# Patient Record
Sex: FEMALE | Race: WHITE | NOT HISPANIC OR LATINO | Employment: UNEMPLOYED | URBAN - METROPOLITAN AREA
[De-identification: names, ages, dates, MRNs, and addresses within clinical notes are randomized per-mention and may not be internally consistent; named-entity substitution may affect disease eponyms.]

---

## 2023-05-08 ENCOUNTER — TELEPHONE (OUTPATIENT)
Dept: PRIMARY CARE CLINIC | Facility: CLINIC | Age: 33
End: 2023-05-08
Payer: COMMERCIAL

## 2023-05-08 NOTE — TELEPHONE ENCOUNTER
Spoke with pt and let her know that she needs to call the  and have them change her primary insurance from medicaid

## 2023-05-08 NOTE — TELEPHONE ENCOUNTER
----- Message from Cesia Gooden sent at 5/8/2023 12:11 PM CDT -----  Regarding: appt req  Contact: pt  Type: Appointment Request    Caller is requesting an appointment     Name of Caller: pt  Reason for appointment: wellness  Would the patient rather a call back or a response via [facility]? Call back  Best Call Back Number: [phone]  Additional Information: pt would like a  appt  Please call to advise, Thank You

## 2023-05-10 ENCOUNTER — TELEPHONE (OUTPATIENT)
Dept: PRIMARY CARE CLINIC | Facility: CLINIC | Age: 33
End: 2023-05-10
Payer: COMMERCIAL

## 2023-05-10 NOTE — TELEPHONE ENCOUNTER
Spoke with pt and advised her that Dr. Zhang don't have anything on 06/09/2023. Pt advised me that she will find someone that can see her. I offered pt another date pt declined.

## 2023-05-10 NOTE — TELEPHONE ENCOUNTER
----- Message from Cristel Garcia sent at 5/10/2023 10:46 AM CDT -----  Contact: Pt  Type:  Sooner Appointment Request    Caller is requesting a sooner appointment.  Caller declined first available appointment listed below.  Caller will not accept being placed on the waitlist and is requesting a message be sent to doctor.    Name of Caller:  Pt  When is the first available appointment?  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

## 2023-06-13 ENCOUNTER — PATIENT MESSAGE (OUTPATIENT)
Dept: PRIMARY CARE CLINIC | Facility: CLINIC | Age: 33
End: 2023-06-13
Payer: COMMERCIAL

## 2023-06-13 DIAGNOSIS — E88.819 INSULIN RESISTANCE: Primary | ICD-10-CM

## 2023-06-13 RX ORDER — METFORMIN HYDROCHLORIDE 500 MG/1
1000 TABLET, EXTENDED RELEASE ORAL 2 TIMES DAILY
Qty: 360 TABLET | Refills: 0 | Status: SHIPPED | OUTPATIENT
Start: 2023-06-13 | End: 2023-09-21

## 2023-06-14 ENCOUNTER — PATIENT MESSAGE (OUTPATIENT)
Dept: PRIMARY CARE CLINIC | Facility: CLINIC | Age: 33
End: 2023-06-14
Payer: COMMERCIAL

## 2023-06-29 ENCOUNTER — PATIENT MESSAGE (OUTPATIENT)
Dept: PRIMARY CARE CLINIC | Facility: CLINIC | Age: 33
End: 2023-06-29

## 2023-06-29 ENCOUNTER — OFFICE VISIT (OUTPATIENT)
Dept: PRIMARY CARE CLINIC | Facility: CLINIC | Age: 33
End: 2023-06-29
Payer: COMMERCIAL

## 2023-06-29 DIAGNOSIS — E03.9 ACQUIRED HYPOTHYROIDISM: ICD-10-CM

## 2023-06-29 DIAGNOSIS — E88.819 INSULIN RESISTANCE: Primary | ICD-10-CM

## 2023-06-29 DIAGNOSIS — E55.9 VITAMIN D DEFICIENCY: ICD-10-CM

## 2023-06-29 PROCEDURE — 99214 OFFICE O/P EST MOD 30 MIN: CPT | Mod: 95,,, | Performed by: FAMILY MEDICINE

## 2023-06-29 PROCEDURE — 99214 PR OFFICE/OUTPT VISIT, EST, LEVL IV, 30-39 MIN: ICD-10-PCS | Mod: 95,,, | Performed by: FAMILY MEDICINE

## 2023-06-29 RX ORDER — LEVOTHYROXINE SODIUM 50 UG/ML
1 SOLUTION ORAL DAILY
Qty: 90 ML | Refills: 1 | Status: SHIPPED | OUTPATIENT
Start: 2023-06-29 | End: 2023-10-10 | Stop reason: SDUPTHER

## 2023-06-29 RX ORDER — ERGOCALCIFEROL 1.25 MG/1
50000 CAPSULE ORAL WEEKLY
Qty: 12 CAPSULE | Refills: 0 | Status: SHIPPED | OUTPATIENT
Start: 2023-06-29 | End: 2023-08-22

## 2023-06-29 NOTE — PROGRESS NOTES
[facility]       [address] [place] [address]      [phone]     Marivel Zhang MD         .      TELEMEDICINE VISIT  06/29/2023    Elly Francis is a 33 y.o. female who presents for Telemed visit.   The patient location is: [place]    The chief complaint leading to consultation is: discuss recent stressors.  Patient has hx of IRS and hypothyroidism.  Felt great when she was taking metformin and tirosint but after losing weight and eating well, she was able to get off but with recent stress, she has gained nearly 30#. Patient is interested in      REVIEW OF SYMPTOMS  All negative except as stated above.      PHYSICAL EXAM:  Alert and awake, Normal appearance, Fatigued appearing, and overweight/obese  Normocephalic, Atraumatic , and Normal facies  EOMI intact and Clear conjunctivae    No cyanosis and No labored breathing  Normal Abdomen, No tenderness/guarding, and Increased Abdominal Girth  Normal Musculoskeletal Exam and Normal Range of Motion  Grossly intact, No tremors observed, and No tics observed  Normal mood, Normal affect, Normal behavior, Congruent sppech, Normal memory, Normal speech, and concerned      ASSESSMENT AND PLAN      1. Insulin resistance  Overview:  Pathophysiology of insulin resistance discussed with patient and therapeutic options were explained.  Dietary and lifestyle changes are recommended for the treatment of insulin resistance.   Low carbohydrate diet and/or possible intermittent fasting is recommended for insulin resistance and/or prediabetes.  Non FDA approved treatment options include but is not limited to GLP's, SGLT-2's, biguanides, and other glucose support supplements. Patient will notify us with any concerns or complaints regarding treatment plan.Weight loss is strongly encouraged and is emphasized to help reduce risk of diabetes.     Assessment & Plan:  Just started back on metformin- will  monitor labs in 3mos    Orders:  -     CBC Auto Differential; Future; Expected date: 09/29/2023  -     Comprehensive Metabolic Panel; Future; Expected date: 09/29/2023  -     Insulin, Random; Future; Expected date: 09/29/2023    2. Acquired hypothyroidism  Overview:  Patient advised to comply with thyroid medications as directed. Patient should take thyroid meds on empty stomach and avoid taking with iron, calcium, zinc and fiber.  Potential risks associated with suppressing TSH (increased arrhythmia and bone loss) can occur as a result of thyroid replacement therapy. Emphasis is  on improving patient symptoms. Patient should notify us if any symptoms occur suggestive of overactive thyroid (fast heart rate, anxiety, sleeplessness, and tremors). Routine follow up recommended      Assessment & Plan:  Will restart thtyroid meds.    Orders:  -     TIROSINT-SOL 50 mcg/mL Soln; Take 1 mL by mouth once daily.  Dispense: 90 mL; Refill: 1  -     TSH; Future; Expected date: 09/29/2023  -     T4, Free; Future; Expected date: 09/29/2023  -     T3, Free; Future; Expected date: 09/29/2023    3. Vitamin D deficiency  Assessment & Plan:  We will replace as this could help with overall mood well-being    Orders:  -     ergocalciferol (ERGOCALCIFEROL) 50,000 unit Cap; Take 1 capsule (50,000 Units total) by mouth once a week.  Dispense: 12 capsule; Refill: 0          I spent a total of 35 minutes face to face and non-face to face on the date of this visit.This includes time preparing to see the patient (eg, review of tests, notes), obtaining and/or reviewing additional history from an independent historian and/or outside medical records, documenting clinical information in the electronic health record, independently interpreting results and/or communicating results to the patient/family/caregiver, or care coordinator.    Disclaimer: Portions of this record may have been created with voice recognition software. Occasional wrong-word or  ""sound-a-like" substitutions may have occurred due to inherent limitations of voice recognition software. Read the chart carefully and recognize, using context, where substitutions have occurred."    Visit type: Telemedicine:audio and visual  Each patient to whom he or she provides medical services by telemedicine is:  (1) informed of the relationship between the physician and patient and the respective role of any other health care provider with respect to management of the patient; and (2) notified that he or she may decline to receive medical services by telemedicine and may withdraw from such care at any time.    Signed by:  MD Dr. Marivel Turner MD      "

## 2023-09-20 DIAGNOSIS — E88.819 INSULIN RESISTANCE: ICD-10-CM

## 2023-09-21 RX ORDER — METFORMIN HYDROCHLORIDE 500 MG/1
TABLET, EXTENDED RELEASE ORAL
Qty: 360 TABLET | Refills: 0 | Status: SHIPPED | OUTPATIENT
Start: 2023-09-21 | End: 2023-12-26

## 2023-10-01 ENCOUNTER — PATIENT MESSAGE (OUTPATIENT)
Dept: PRIMARY CARE CLINIC | Facility: CLINIC | Age: 33
End: 2023-10-01
Payer: COMMERCIAL

## 2023-10-10 DIAGNOSIS — E03.9 ACQUIRED HYPOTHYROIDISM: ICD-10-CM

## 2023-10-10 RX ORDER — LEVOTHYROXINE SODIUM 50 UG/ML
SOLUTION ORAL
Qty: 90 ML | Refills: 1 | Status: SHIPPED | OUTPATIENT
Start: 2023-10-10

## 2023-12-26 DIAGNOSIS — E88.819 INSULIN RESISTANCE: ICD-10-CM

## 2023-12-26 RX ORDER — METFORMIN HYDROCHLORIDE 500 MG/1
TABLET, EXTENDED RELEASE ORAL
Qty: 360 TABLET | Refills: 0 | Status: SHIPPED | OUTPATIENT
Start: 2023-12-26 | End: 2024-04-02

## 2024-02-01 ENCOUNTER — PATIENT MESSAGE (OUTPATIENT)
Dept: PRIMARY CARE CLINIC | Facility: CLINIC | Age: 34
End: 2024-02-01
Payer: COMMERCIAL

## 2024-02-02 RX ORDER — VALACYCLOVIR HYDROCHLORIDE 1 G/1
1000 TABLET, FILM COATED ORAL 2 TIMES DAILY
Qty: 10 TABLET | Refills: 0 | Status: SHIPPED | OUTPATIENT
Start: 2024-02-02 | End: 2024-04-02 | Stop reason: SDUPTHER

## 2024-03-19 ENCOUNTER — OFFICE VISIT (OUTPATIENT)
Dept: INTERNAL MEDICINE | Facility: CLINIC | Age: 34
End: 2024-03-19
Payer: COMMERCIAL

## 2024-03-19 VITALS
DIASTOLIC BLOOD PRESSURE: 70 MMHG | HEIGHT: 67 IN | TEMPERATURE: 99 F | HEART RATE: 90 BPM | OXYGEN SATURATION: 100 % | SYSTOLIC BLOOD PRESSURE: 118 MMHG | WEIGHT: 174.38 LBS | BODY MASS INDEX: 27.37 KG/M2

## 2024-03-19 DIAGNOSIS — Z00.00 ROUTINE HEALTH MAINTENANCE: ICD-10-CM

## 2024-03-19 DIAGNOSIS — E03.9 ACQUIRED HYPOTHYROIDISM: ICD-10-CM

## 2024-03-19 DIAGNOSIS — E88.819 INSULIN RESISTANCE: Primary | ICD-10-CM

## 2024-03-19 PROBLEM — Z90.79 HISTORY OF BILATERAL SALPINGECTOMY: Status: ACTIVE | Noted: 2024-03-19

## 2024-03-19 PROBLEM — Z98.890 HISTORY OF ENDOMETRIAL ABLATION: Status: ACTIVE | Noted: 2024-03-19

## 2024-03-19 PROBLEM — Z91.89 INCREASED RISK OF BREAST CANCER: Status: ACTIVE | Noted: 2024-03-07

## 2024-03-19 PROCEDURE — 3008F BODY MASS INDEX DOCD: CPT | Mod: CPTII,S$GLB,, | Performed by: PHYSICIAN ASSISTANT

## 2024-03-19 PROCEDURE — 3078F DIAST BP <80 MM HG: CPT | Mod: CPTII,S$GLB,, | Performed by: PHYSICIAN ASSISTANT

## 2024-03-19 PROCEDURE — 3074F SYST BP LT 130 MM HG: CPT | Mod: CPTII,S$GLB,, | Performed by: PHYSICIAN ASSISTANT

## 2024-03-19 PROCEDURE — 1160F RVW MEDS BY RX/DR IN RCRD: CPT | Mod: CPTII,S$GLB,, | Performed by: PHYSICIAN ASSISTANT

## 2024-03-19 PROCEDURE — 1159F MED LIST DOCD IN RCRD: CPT | Mod: CPTII,S$GLB,, | Performed by: PHYSICIAN ASSISTANT

## 2024-03-19 PROCEDURE — 99204 OFFICE O/P NEW MOD 45 MIN: CPT | Mod: S$GLB,,, | Performed by: PHYSICIAN ASSISTANT

## 2024-03-19 PROCEDURE — 99999 PR PBB SHADOW E&M-EST. PATIENT-LVL IV: CPT | Mod: PBBFAC,,, | Performed by: PHYSICIAN ASSISTANT

## 2024-03-19 RX ORDER — TRAZODONE HYDROCHLORIDE 50 MG/1
50 TABLET ORAL NIGHTLY PRN
Qty: 30 TABLET | Refills: 11 | Status: SHIPPED | OUTPATIENT
Start: 2024-03-19 | End: 2024-03-21

## 2024-03-19 RX ORDER — FLUOXETINE HYDROCHLORIDE 20 MG/1
20 CAPSULE ORAL DAILY
Qty: 30 CAPSULE | Refills: 11 | Status: SHIPPED | OUTPATIENT
Start: 2024-03-19 | End: 2024-03-21

## 2024-03-19 NOTE — PROGRESS NOTES
Subjective:      Patient ID: Elly Francis is a 34 y.o. female.    Chief Complaint: Establish Care      Here today to establish care with new provider. She was seeing Dr. Zhang in the past.   Anxiety  Presents for initial visit. Episode onset: 8 months. The problem has been rapidly worsening. Symptoms include decreased concentration, depressed mood, excessive worry, insomnia, malaise, muscle tension, nervous/anxious behavior and restlessness. Patient reports no chest pain, confusion, dizziness, nausea, palpitations, shortness of breath or suicidal ideas. Symptoms occur constantly. The severity of symptoms is causing significant distress. The symptoms are aggravated by family issues (recently adopted special needs child, sudden death of grandfather, dad had stroke). The quality of sleep is non-restorative.     There is no history of anemia, anxiety/panic attacks, arrhythmia, asthma, bipolar disorder, CAD, CHF, chronic lung disease, depression, fibromyalgia, hyperthyroidism or suicide attempts. Past treatments include nothing.     In 2022 pt was able to manage her weight and thyroid problems with lifestyle changes, exercise, weight loss. She was off her tirosint and metformin. IN 2023 she was unexpectedly acquired a special needs new born. Sister was addicted to drugs and baby borne addicted with pulm htn among other related medical problems. Pts life was turned upside down. She has frequent doctors appointment to take the new baby to, at least 4 a week. She has put on a lot of weight since taking on this new baby. She plans on eventually adopting the special needs child. She restarted the metformin and tirosint about 6 months ago but still unable to get the weight off. Not sleeping well at night. Frequently waking up and trouble falling asleep.     Failed tx synthroid due to SE (anxiety).     Patient Active Problem List   Diagnosis    Vitamin D deficiency    Acquired hypothyroidism    Insulin resistance     History of bilateral salpingectomy    History of endometrial ablation    Increased risk of breast cancer         Current Outpatient Medications:     metFORMIN (GLUCOPHAGE-XR) 500 MG ER 24hr tablet, TAKE 2 TABLETS(1000 MG) BY MOUTH TWICE DAILY, Disp: 360 tablet, Rfl: 0    TIROSINT-SOL 50 mcg/mL Soln, TAKE CONtENTS OF ONE AMPULE BY MOUTH DAILY, Disp: 90 mL, Rfl: 1    valACYclovir (VALTREX) 1000 MG tablet, Take 1 tablet (1,000 mg total) by mouth 2 (two) times daily., Disp: 10 tablet, Rfl: 0    Review of Systems   Constitutional:  Positive for unexpected weight change. Negative for activity change, appetite change, chills, diaphoresis, fatigue and fever.   HENT: Negative.  Negative for congestion, hearing loss, postnasal drip, rhinorrhea, sore throat, trouble swallowing and voice change.    Eyes: Negative.  Negative for visual disturbance.   Respiratory: Negative.  Negative for cough, choking, chest tightness and shortness of breath.    Cardiovascular:  Negative for chest pain, palpitations and leg swelling.   Gastrointestinal:  Negative for abdominal distention, abdominal pain, blood in stool, constipation, diarrhea, nausea and vomiting.   Endocrine: Negative for cold intolerance, heat intolerance, polydipsia and polyuria.   Genitourinary: Negative.  Negative for difficulty urinating and frequency.   Musculoskeletal:  Negative for arthralgias, back pain, gait problem, joint swelling and myalgias.   Skin:  Negative for color change, pallor, rash and wound.   Neurological:  Negative for dizziness, tremors, weakness, light-headedness, numbness and headaches.   Hematological:  Negative for adenopathy.   Psychiatric/Behavioral:  Positive for decreased concentration, dysphoric mood and sleep disturbance. Negative for agitation, behavioral problems, confusion, hallucinations, self-injury and suicidal ideas. The patient is nervous/anxious and has insomnia. The patient is not hyperactive.      Objective:   /70 (BP  "Location: Right arm, Patient Position: Sitting, BP Method: Large (Manual))   Pulse 90   Temp 98.9 °F (37.2 °C) (Tympanic)   Ht 5' 7" (1.702 m)   Wt 79.1 kg (174 lb 6.1 oz)   SpO2 100%   BMI 27.31 kg/m²     Physical Exam  Vitals and nursing note reviewed.   Constitutional:       General: She is not in acute distress.     Appearance: Normal appearance. She is well-developed. She is not ill-appearing, toxic-appearing or diaphoretic.   HENT:      Head: Normocephalic and atraumatic.   Cardiovascular:      Rate and Rhythm: Normal rate and regular rhythm.      Heart sounds: Normal heart sounds. No murmur heard.     No friction rub. No gallop.   Pulmonary:      Effort: Pulmonary effort is normal. No respiratory distress.      Breath sounds: Normal breath sounds. No wheezing or rales.   Musculoskeletal:         General: Normal range of motion.   Skin:     General: Skin is warm.      Capillary Refill: Capillary refill takes less than 2 seconds.      Findings: No rash.   Neurological:      Mental Status: She is alert and oriented to person, place, and time.      Motor: No weakness.      Gait: Gait normal.   Psychiatric:         Attention and Perception: Attention and perception normal.         Mood and Affect: Mood is depressed. Affect is tearful.         Speech: Speech normal.         Behavior: Behavior normal.         Thought Content: Thought content normal. Thought content is not paranoid or delusional. Thought content does not include homicidal or suicidal ideation. Thought content does not include homicidal or suicidal plan.         Cognition and Memory: Cognition and memory normal.         Judgment: Judgment normal.         Assessment:     1. Insulin resistance    2. Acquired hypothyroidism    3. Routine health maintenance      Plan:   Insulin resistance  -     Comprehensive Metabolic Panel; Future  -     Hemoglobin A1C; Future; Expected date: 03/19/2024  -     Insulin, random; Future; Expected date: " 03/19/2024    Acquired hypothyroidism  -     TSH; Future; Expected date: 03/19/2024  -     T4, Free; Future; Expected date: 03/19/2024  -     T3; Future; Expected date: 03/19/2024    Routine health maintenance  -     CBC Auto Differential; Future; Expected date: 03/19/2024    Other orders  -     Discontinue: FLUoxetine 20 MG capsule; Take 1 capsule (20 mg total) by mouth once daily.  Dispense: 30 capsule; Refill: 11  -     Discontinue: traZODone (DESYREL) 50 MG tablet; Take 1 tablet (50 mg total) by mouth nightly as needed for Insomnia.  Dispense: 30 tablet; Refill: 11    -diagnosis and treatment options discussed in detail. Agree to start on fluoxetine daily and trazodone as needed at night to help her sleep. SE discussed. Recheck in 1 month.   -recheck labs fasting as well to see if any adjustments need to be made      ADDENDUM: PT DECLINES TO START ANYTHING FOR MOOD. WILL DISCONTINUE THE FLUOXETINE.     Follow up in about 4 weeks (around 4/16/2024), or if symptoms worsen or fail to improve.

## 2024-03-20 ENCOUNTER — PATIENT MESSAGE (OUTPATIENT)
Dept: INTERNAL MEDICINE | Facility: CLINIC | Age: 34
End: 2024-03-20
Payer: COMMERCIAL

## 2024-03-20 ENCOUNTER — LAB VISIT (OUTPATIENT)
Dept: LAB | Facility: HOSPITAL | Age: 34
End: 2024-03-20
Attending: PHYSICIAN ASSISTANT
Payer: COMMERCIAL

## 2024-03-20 DIAGNOSIS — Z00.00 ROUTINE HEALTH MAINTENANCE: ICD-10-CM

## 2024-03-20 DIAGNOSIS — E03.9 ACQUIRED HYPOTHYROIDISM: ICD-10-CM

## 2024-03-20 DIAGNOSIS — E88.819 INSULIN RESISTANCE: ICD-10-CM

## 2024-03-20 LAB
ALBUMIN SERPL BCP-MCNC: 4 G/DL (ref 3.5–5.2)
ALP SERPL-CCNC: 63 U/L (ref 55–135)
ALT SERPL W/O P-5'-P-CCNC: 22 U/L (ref 10–44)
ANION GAP SERPL CALC-SCNC: 8 MMOL/L (ref 8–16)
AST SERPL-CCNC: 23 U/L (ref 10–40)
BASOPHILS # BLD AUTO: 0.06 K/UL (ref 0–0.2)
BASOPHILS NFR BLD: 1.3 % (ref 0–1.9)
BILIRUB SERPL-MCNC: 1.2 MG/DL (ref 0.1–1)
BUN SERPL-MCNC: 10 MG/DL (ref 6–20)
CALCIUM SERPL-MCNC: 9.2 MG/DL (ref 8.7–10.5)
CHLORIDE SERPL-SCNC: 107 MMOL/L (ref 95–110)
CO2 SERPL-SCNC: 24 MMOL/L (ref 23–29)
CREAT SERPL-MCNC: 0.8 MG/DL (ref 0.5–1.4)
DIFFERENTIAL METHOD BLD: ABNORMAL
EOSINOPHIL # BLD AUTO: 0.3 K/UL (ref 0–0.5)
EOSINOPHIL NFR BLD: 6.2 % (ref 0–8)
ERYTHROCYTE [DISTWIDTH] IN BLOOD BY AUTOMATED COUNT: 13.2 % (ref 11.5–14.5)
EST. GFR  (NO RACE VARIABLE): >60 ML/MIN/1.73 M^2
ESTIMATED AVG GLUCOSE: 103 MG/DL (ref 68–131)
GLUCOSE SERPL-MCNC: 91 MG/DL (ref 70–110)
HBA1C MFR BLD: 5.2 % (ref 4–5.6)
HCT VFR BLD AUTO: 42.7 % (ref 37–48.5)
HGB BLD-MCNC: 13.8 G/DL (ref 12–16)
IMM GRANULOCYTES # BLD AUTO: 0.03 K/UL (ref 0–0.04)
IMM GRANULOCYTES NFR BLD AUTO: 0.6 % (ref 0–0.5)
INSULIN COLLECTION INTERVAL: NORMAL
INSULIN SERPL-ACNC: 6.7 UU/ML
LYMPHOCYTES # BLD AUTO: 1.4 K/UL (ref 1–4.8)
LYMPHOCYTES NFR BLD: 30.2 % (ref 18–48)
MCH RBC QN AUTO: 28.2 PG (ref 27–31)
MCHC RBC AUTO-ENTMCNC: 32.3 G/DL (ref 32–36)
MCV RBC AUTO: 87 FL (ref 82–98)
MONOCYTES # BLD AUTO: 0.3 K/UL (ref 0.3–1)
MONOCYTES NFR BLD: 6.2 % (ref 4–15)
NEUTROPHILS # BLD AUTO: 2.6 K/UL (ref 1.8–7.7)
NEUTROPHILS NFR BLD: 55.5 % (ref 38–73)
NRBC BLD-RTO: 0 /100 WBC
PLATELET # BLD AUTO: 256 K/UL (ref 150–450)
PMV BLD AUTO: 10.9 FL (ref 9.2–12.9)
POTASSIUM SERPL-SCNC: 4.1 MMOL/L (ref 3.5–5.1)
PROT SERPL-MCNC: 6.9 G/DL (ref 6–8.4)
RBC # BLD AUTO: 4.89 M/UL (ref 4–5.4)
SODIUM SERPL-SCNC: 139 MMOL/L (ref 136–145)
T4 FREE SERPL-MCNC: 1.14 NG/DL (ref 0.71–1.51)
TSH SERPL DL<=0.005 MIU/L-ACNC: 1.42 UIU/ML (ref 0.4–4)
WBC # BLD AUTO: 4.7 K/UL (ref 3.9–12.7)

## 2024-03-20 PROCEDURE — 36415 COLL VENOUS BLD VENIPUNCTURE: CPT | Mod: PO,XB | Performed by: PHYSICIAN ASSISTANT

## 2024-03-20 PROCEDURE — 80053 COMPREHEN METABOLIC PANEL: CPT | Performed by: PHYSICIAN ASSISTANT

## 2024-03-20 PROCEDURE — 84439 ASSAY OF FREE THYROXINE: CPT | Performed by: PHYSICIAN ASSISTANT

## 2024-03-20 PROCEDURE — 85025 COMPLETE CBC W/AUTO DIFF WBC: CPT | Performed by: PHYSICIAN ASSISTANT

## 2024-03-20 PROCEDURE — 83525 ASSAY OF INSULIN: CPT | Performed by: PHYSICIAN ASSISTANT

## 2024-03-20 PROCEDURE — 84480 ASSAY TRIIODOTHYRONINE (T3): CPT | Performed by: PHYSICIAN ASSISTANT

## 2024-03-20 PROCEDURE — 83036 HEMOGLOBIN GLYCOSYLATED A1C: CPT | Performed by: PHYSICIAN ASSISTANT

## 2024-03-20 PROCEDURE — 84443 ASSAY THYROID STIM HORMONE: CPT | Performed by: PHYSICIAN ASSISTANT

## 2024-03-21 LAB — T3 SERPL-MCNC: 101 NG/DL (ref 60–180)

## 2024-04-02 DIAGNOSIS — E88.819 INSULIN RESISTANCE: ICD-10-CM

## 2024-04-02 RX ORDER — METFORMIN HYDROCHLORIDE 500 MG/1
TABLET, EXTENDED RELEASE ORAL
Qty: 360 TABLET | Refills: 0 | Status: SHIPPED | OUTPATIENT
Start: 2024-04-02

## 2024-04-08 DIAGNOSIS — E03.9 ACQUIRED HYPOTHYROIDISM: ICD-10-CM

## 2024-04-09 RX ORDER — LEVOTHYROXINE SODIUM 50 UG/ML
SOLUTION ORAL
Qty: 90 ML | Refills: 1 | Status: SHIPPED | OUTPATIENT
Start: 2024-04-09